# Patient Record
Sex: OTHER/UNKNOWN | Race: WHITE | URBAN - METROPOLITAN AREA
[De-identification: names, ages, dates, MRNs, and addresses within clinical notes are randomized per-mention and may not be internally consistent; named-entity substitution may affect disease eponyms.]

---

## 2018-12-20 ENCOUNTER — APPOINTMENT (OUTPATIENT)
Dept: URBAN - METROPOLITAN AREA CLINIC 290 | Age: 68
Setting detail: DERMATOLOGY
End: 2018-12-29

## 2018-12-20 DIAGNOSIS — L21.8 OTHER SEBORRHEIC DERMATITIS: ICD-10-CM

## 2018-12-20 PROCEDURE — OTHER MIPS QUALITY: OTHER

## 2018-12-20 PROCEDURE — 99202 OFFICE O/P NEW SF 15 MIN: CPT

## 2018-12-20 PROCEDURE — OTHER PRESCRIPTION: OTHER

## 2018-12-20 PROCEDURE — OTHER COUNSELING: OTHER

## 2018-12-20 RX ORDER — KETOCONAZOLE 20 MG/G
CREAM TOPICAL
Qty: 60 | Refills: 2 | Status: ERX | COMMUNITY
Start: 2018-12-20

## 2018-12-20 ASSESSMENT — LOCATION DETAILED DESCRIPTION DERM: LOCATION DETAILED: LEFT INFERIOR CENTRAL MALAR CHEEK

## 2018-12-20 ASSESSMENT — LOCATION SIMPLE DESCRIPTION DERM: LOCATION SIMPLE: LEFT CHEEK

## 2018-12-20 ASSESSMENT — LOCATION ZONE DERM: LOCATION ZONE: FACE

## 2019-04-25 ENCOUNTER — APPOINTMENT (OUTPATIENT)
Dept: URBAN - METROPOLITAN AREA CLINIC 290 | Age: 69
Setting detail: DERMATOLOGY
End: 2019-04-28

## 2019-04-25 DIAGNOSIS — Z12.83 ENCOUNTER FOR SCREENING FOR MALIGNANT NEOPLASM OF SKIN: ICD-10-CM

## 2019-04-25 DIAGNOSIS — D18.0 HEMANGIOMA: ICD-10-CM

## 2019-04-25 DIAGNOSIS — L71.0 PERIORAL DERMATITIS: ICD-10-CM

## 2019-04-25 DIAGNOSIS — D22 MELANOCYTIC NEVI: ICD-10-CM

## 2019-04-25 DIAGNOSIS — L81.4 OTHER MELANIN HYPERPIGMENTATION: ICD-10-CM

## 2019-04-25 DIAGNOSIS — L82.1 OTHER SEBORRHEIC KERATOSIS: ICD-10-CM

## 2019-04-25 DIAGNOSIS — L21.8 OTHER SEBORRHEIC DERMATITIS: ICD-10-CM

## 2019-04-25 PROBLEM — D22.72 MELANOCYTIC NEVI OF LEFT LOWER LIMB, INCLUDING HIP: Status: ACTIVE | Noted: 2019-04-25

## 2019-04-25 PROBLEM — D22.5 MELANOCYTIC NEVI OF TRUNK: Status: ACTIVE | Noted: 2019-04-25

## 2019-04-25 PROBLEM — D22.61 MELANOCYTIC NEVI OF RIGHT UPPER LIMB, INCLUDING SHOULDER: Status: ACTIVE | Noted: 2019-04-25

## 2019-04-25 PROBLEM — D18.01 HEMANGIOMA OF SKIN AND SUBCUTANEOUS TISSUE: Status: ACTIVE | Noted: 2019-04-25

## 2019-04-25 PROCEDURE — 11104 PUNCH BX SKIN SINGLE LESION: CPT

## 2019-04-25 PROCEDURE — OTHER REASSURANCE: OTHER

## 2019-04-25 PROCEDURE — OTHER TREATMENT REGIMEN: OTHER

## 2019-04-25 PROCEDURE — OTHER PRESCRIPTION: OTHER

## 2019-04-25 PROCEDURE — OTHER COUNSELING: OTHER

## 2019-04-25 PROCEDURE — 99214 OFFICE O/P EST MOD 30 MIN: CPT | Mod: 25

## 2019-04-25 PROCEDURE — OTHER BIOPSY BY PUNCH METHOD: OTHER

## 2019-04-25 PROCEDURE — OTHER BIOPSY BY SHAVE METHOD: OTHER

## 2019-04-25 RX ORDER — DOXYCYCLINE HYCLATE 20 MG/1
TABLET, FILM COATED ORAL TWICE DAILY
Qty: 60 | Refills: 0 | Status: ERX | COMMUNITY
Start: 2019-04-25

## 2019-04-25 ASSESSMENT — LOCATION DETAILED DESCRIPTION DERM
LOCATION DETAILED: LEFT INFERIOR CENTRAL MALAR CHEEK
LOCATION DETAILED: LEFT MEDIAL BUCCAL CHEEK
LOCATION DETAILED: RIGHT SUPERIOR UPPER BACK
LOCATION DETAILED: LEFT SUPERIOR UPPER BACK
LOCATION DETAILED: LEFT ANTERIOR DISTAL THIGH
LOCATION DETAILED: RIGHT ANTERIOR SHOULDER

## 2019-04-25 ASSESSMENT — LOCATION SIMPLE DESCRIPTION DERM
LOCATION SIMPLE: LEFT CHEEK
LOCATION SIMPLE: RIGHT UPPER BACK
LOCATION SIMPLE: RIGHT SHOULDER
LOCATION SIMPLE: LEFT UPPER BACK
LOCATION SIMPLE: LEFT THIGH

## 2019-04-25 ASSESSMENT — LOCATION ZONE DERM
LOCATION ZONE: LEG
LOCATION ZONE: ARM
LOCATION ZONE: FACE
LOCATION ZONE: TRUNK

## 2019-04-25 NOTE — PROCEDURE: BIOPSY BY SHAVE METHOD
Dressing: waterproof dressing
Post-Care Instructions: I reviewed with the patient in detail post-care instructions. Patient is to keep the biopsy site dry overnight, and then apply Vaseline twice daily until healed.
Cryotherapy Text: The wound bed was treated with cryotherapy after the biopsy was performed.
Bill [id number] For Specimen Handling/Conveyance To Laboratory?: no
Silver Nitrate Text: The wound bed was treated with silver nitrate after the biopsy was performed.
Electrodesiccation Text: The wound bed was treated with electrodesiccation after the biopsy was performed.
X Size Of Lesion In Cm: 0.4
Render Post-Care Instructions In Note?: yes
Type Of Destruction Used: Electrodesiccation
Anesthesia Type: 1% lidocaine with 1:500,000 epinephrine and a 1:10 solution of 8.4% sodium bicarbonate
Electrodesiccation And Curettage Text: The wound bed was treated with electrodesiccation and curettage after the biopsy was performed.
Wound Care: Aquaphor
Billing Type: Third-Party Bill
Path Notes (To The Dermatopathologist): 4mm\\nLipkin
Detail Level: Detailed
Biopsy Type: H and E
Biopsy Method: Dermablade
Additional Anesthesia Volume In Cc (Will Not Render If 0): 0
Hemostasis: Aluminum Chloride
Depth Of Biopsy: dermis
Notification Instructions: Patient will be notified of biopsy results. However, patient instructed to call the office if not contacted within 2 weeks.
Anesthesia Volume In Cc (Will Not Render If 0): 0.2
Curettage Text: The wound bed was treated with curettage after the biopsy was performed.
Consent: Verbal consent was obtained and risks were reviewed including but not limited to scarring, infection, bleeding, scabbing, incomplete removal, nerve damage and allergy to anesthesia.

## 2019-04-25 NOTE — PROCEDURE: BIOPSY BY PUNCH METHOD
Billing Type: Third-Party Bill
Anesthesia Type: 1% lidocaine with 1:500,000 epinephrine and a 1:10 solution of 8.4% sodium bicarbonate
Additional Anesthesia Volume In Cc (Will Not Render If 0): 0
Bill For Surgical Tray: no
Epidermal Sutures: 5-0 Nylon
Detail Level: Detailed
Dressing: no dressing applied
Biopsy Type: H and E
Consent: Warned of scar, infection, bleeding, change in pigmentation and possible need for further treatment. Verbal consent given
Was A Bandage Applied: Yes
Home Suture Removal Text: Patient was provided a home suture removal kit and will remove their sutures at home.  If they have any questions or difficulties they will call the office.
Suture Removal: 14 days
Wound Care: Vaseline
Path Notes (To The Dermatopathologist): 5mm\\nLipkin
Punch Size In Mm: 6
Hemostasis: None
Post-Care Instructions: Wash with soap and water twice a day. Apply Vaseline and clean bandage twice daily until next visit.
Anesthesia Volume In Cc (Will Not Render If 0): 1

## 2019-04-25 NOTE — PROCEDURE: TREATMENT REGIMEN
Detail Level: Zone
Plan: Wash daily with vaniply wash\\nCerave AM to the face in the morning\\nEpiceram at night to face for 2 weeks.  (2 samples given)

## 2019-05-09 ENCOUNTER — APPOINTMENT (OUTPATIENT)
Dept: URBAN - METROPOLITAN AREA CLINIC 290 | Age: 69
Setting detail: DERMATOLOGY
End: 2019-05-12

## 2019-05-09 DIAGNOSIS — L21.8 OTHER SEBORRHEIC DERMATITIS: ICD-10-CM

## 2019-05-09 DIAGNOSIS — D22 MELANOCYTIC NEVI: ICD-10-CM

## 2019-05-09 PROBLEM — D22.72 MELANOCYTIC NEVI OF LEFT LOWER LIMB, INCLUDING HIP: Status: ACTIVE | Noted: 2019-05-09

## 2019-05-09 PROBLEM — D22.61 MELANOCYTIC NEVI OF RIGHT UPPER LIMB, INCLUDING SHOULDER: Status: ACTIVE | Noted: 2019-05-09

## 2019-05-09 PROCEDURE — OTHER COUNSELING: OTHER

## 2019-05-09 PROCEDURE — OTHER PRESCRIPTION: OTHER

## 2019-05-09 PROCEDURE — OTHER PATHOLOGY DISCUSSION: OTHER

## 2019-05-09 PROCEDURE — 99213 OFFICE O/P EST LOW 20 MIN: CPT

## 2019-05-09 PROCEDURE — OTHER TREATMENT REGIMEN: OTHER

## 2019-05-09 RX ORDER — SULFACETAMIDE SODIUM, SULFUR 98; 48 MG/G; MG/G
CREAM TOPICAL
Qty: 57 | Refills: 0 | Status: ERX | COMMUNITY
Start: 2019-05-09

## 2019-05-09 ASSESSMENT — LOCATION SIMPLE DESCRIPTION DERM
LOCATION SIMPLE: RIGHT SHOULDER
LOCATION SIMPLE: LEFT THIGH
LOCATION SIMPLE: LEFT CHEEK

## 2019-05-09 ASSESSMENT — LOCATION ZONE DERM
LOCATION ZONE: ARM
LOCATION ZONE: LEG
LOCATION ZONE: FACE

## 2019-05-09 ASSESSMENT — LOCATION DETAILED DESCRIPTION DERM
LOCATION DETAILED: LEFT ANTERIOR DISTAL THIGH
LOCATION DETAILED: LEFT MEDIAL MALAR CHEEK
LOCATION DETAILED: RIGHT ANTERIOR SHOULDER

## 2019-05-30 ENCOUNTER — APPOINTMENT (OUTPATIENT)
Dept: URBAN - METROPOLITAN AREA CLINIC 290 | Age: 69
Setting detail: DERMATOLOGY
End: 2019-06-02

## 2019-05-30 DIAGNOSIS — D22 MELANOCYTIC NEVI: ICD-10-CM

## 2019-05-30 DIAGNOSIS — L21.8 OTHER SEBORRHEIC DERMATITIS: ICD-10-CM

## 2019-05-30 PROBLEM — D22.72 MELANOCYTIC NEVI OF LEFT LOWER LIMB, INCLUDING HIP: Status: ACTIVE | Noted: 2019-05-30

## 2019-05-30 PROCEDURE — 99213 OFFICE O/P EST LOW 20 MIN: CPT

## 2019-05-30 PROCEDURE — OTHER TREATMENT REGIMEN: OTHER

## 2019-05-30 PROCEDURE — OTHER COUNSELING: OTHER

## 2019-05-30 PROCEDURE — OTHER MIPS QUALITY: OTHER

## 2019-05-30 ASSESSMENT — LOCATION ZONE DERM
LOCATION ZONE: FACE
LOCATION ZONE: LEG

## 2019-05-30 ASSESSMENT — LOCATION DETAILED DESCRIPTION DERM
LOCATION DETAILED: LEFT ANTERIOR DISTAL THIGH
LOCATION DETAILED: LEFT MEDIAL MALAR CHEEK

## 2019-05-30 ASSESSMENT — LOCATION SIMPLE DESCRIPTION DERM
LOCATION SIMPLE: LEFT CHEEK
LOCATION SIMPLE: LEFT THIGH

## 2019-05-30 NOTE — PROCEDURE: TREATMENT REGIMEN
Plan: Vaniply wash daily. Plexion 9.8 %-4.8 % topical cream: Apply to the face once daily
Plan: Massage area twice daily for 2-3 minutes
Detail Level: Zone